# Patient Record
Sex: MALE | Race: ASIAN | NOT HISPANIC OR LATINO | Employment: FULL TIME | URBAN - METROPOLITAN AREA
[De-identification: names, ages, dates, MRNs, and addresses within clinical notes are randomized per-mention and may not be internally consistent; named-entity substitution may affect disease eponyms.]

---

## 2018-01-12 ENCOUNTER — OFFICE VISIT (OUTPATIENT)
Dept: PEDIATRICS | Facility: CLINIC | Age: 18
End: 2018-01-12
Payer: COMMERCIAL

## 2018-01-12 VITALS
SYSTOLIC BLOOD PRESSURE: 94 MMHG | HEART RATE: 95 BPM | DIASTOLIC BLOOD PRESSURE: 56 MMHG | HEIGHT: 66 IN | OXYGEN SATURATION: 98 % | BODY MASS INDEX: 22.63 KG/M2 | TEMPERATURE: 101.3 F | WEIGHT: 140.8 LBS

## 2018-01-12 DIAGNOSIS — R07.0 THROAT PAIN: ICD-10-CM

## 2018-01-12 DIAGNOSIS — J98.9 FEBRILE RESPIRATORY ILLNESS: ICD-10-CM

## 2018-01-12 DIAGNOSIS — R50.9 FEBRILE RESPIRATORY ILLNESS: ICD-10-CM

## 2018-01-12 DIAGNOSIS — J10.1 INFLUENZA A: Primary | ICD-10-CM

## 2018-01-12 LAB
DEPRECATED S PYO AG THROAT QL EIA: NORMAL
FLUAV+FLUBV AG SPEC QL: NEGATIVE
FLUAV+FLUBV AG SPEC QL: POSITIVE
SPECIMEN SOURCE: ABNORMAL
SPECIMEN SOURCE: NORMAL

## 2018-01-12 PROCEDURE — 87880 STREP A ASSAY W/OPTIC: CPT | Performed by: INTERNAL MEDICINE

## 2018-01-12 PROCEDURE — 87081 CULTURE SCREEN ONLY: CPT | Performed by: INTERNAL MEDICINE

## 2018-01-12 PROCEDURE — 87804 INFLUENZA ASSAY W/OPTIC: CPT | Performed by: INTERNAL MEDICINE

## 2018-01-12 PROCEDURE — 99213 OFFICE O/P EST LOW 20 MIN: CPT | Performed by: INTERNAL MEDICINE

## 2018-01-12 RX ORDER — OSELTAMIVIR PHOSPHATE 75 MG/1
75 CAPSULE ORAL 2 TIMES DAILY
Qty: 10 CAPSULE | Refills: 0 | Status: SHIPPED | OUTPATIENT
Start: 2018-01-12 | End: 2019-02-01

## 2018-01-12 NOTE — NURSING NOTE
"Chief Complaint   Patient presents with     Flu       Initial BP 94/56  Pulse 95  Temp 101.3  F (38.5  C) (Oral)  Ht 5' 5.75\" (1.67 m)  Wt 140 lb 12.8 oz (63.9 kg)  SpO2 98%  BMI 22.9 kg/m2 Estimated body mass index is 22.9 kg/(m^2) as calculated from the following:    Height as of this encounter: 5' 5.75\" (1.67 m).    Weight as of this encounter: 140 lb 12.8 oz (63.9 kg).  Medication Reconciliation: complete   Neida Perales MA      "

## 2018-01-12 NOTE — LETTER
January 12, 2018      Thomas Rodrigez  [address] [facility] [address]        To Whom It May Concern:    Thomas Rodrigez was seen on 1/12/18. Please excuse him from work on 1/13/18 & 1/14/18 due to illness.        Sincerely,        Sascha Loera MD

## 2018-01-12 NOTE — NURSING NOTE
The following medication was given:     MEDICATION: Tylenol  ROUTE: PO  SITE: mouth  DOSE: 650 mg   LOT #: [id number]   : [employer]  EXPIRATION DATE:  08/19  NDC#: 5152-8298-71  Neida Perales MA

## 2018-01-12 NOTE — PROGRESS NOTES
SUBJECTIVE:   Thomas Rodrigez is a 17 year old male who presents to clinic today for the following health issues:    RESPIRATORY SYMPTOMS    Duration: 1 day ago    Description  nasal congestion, rhinorrhea, sore throat, facial pain/pressure, cough,  fever, chills, headache, hoarse voice and myalgias    Severity: moderate    Accompanying signs and symptoms: None    History (predisposing factors):  none    Precipitating or alleviating factors: None    Therapies tried and outcome:  Vtiamins- Men and C, Dayquil    Woke up at 2am feeling terrible. Hit him all at once. Has not taken any tylenol or ibuprofen for the fever today.     No n/v  Drinking well  Decreased PO    School and working - people are probably sick  Brother was sick a few days ago -> fever, chills, respiratory.     Problem list and histories reviewed & adjusted, as indicated.  Additional history: as documented    There is no problem list on file for this patient.    History reviewed. No pertinent surgical history.    Social History   Substance Use Topics     Smoking status: Never Smoker     Smokeless tobacco: Never Used     Alcohol use No     Family History   Problem Relation Age of Onset     Family History Negative Mother      Family History Negative Father      Family History Negative Maternal Grandmother      Family History Negative Maternal Grandfather      Family History Negative Paternal Grandmother      Family History Negative Paternal Grandfather      Family History Negative Brother      Family History Negative Brother          Current Outpatient Prescriptions   Medication Sig Dispense Refill     oseltamivir (TAMIFLU) 75 MG capsule Take 1 capsule (75 mg) by mouth 2 times daily 10 capsule 0     No Known Allergies    Reviewed and updated as needed this visit by clinical staffTobacco  Allergies  Meds  Problems  Med Hx  Surg Hx  Fam Hx  Soc Hx        Reviewed and updated as needed this visit by Provider  Allergies  Meds  Problems      "    ROS:  Constitutional, HEENT, cardiovascular, pulmonary, gi and gu systems are negative, except as otherwise noted.      OBJECTIVE:   BP 94/56  Pulse 95  Temp 101.3  F (38.5  C) (Oral)  Ht 5' 5.75\" (1.67 m)  Wt 140 lb 12.8 oz (63.9 kg)  SpO2 98%  BMI 22.9 kg/m2  Body mass index is 22.9 kg/(m^2).  GENERAL: healthy, alert and no distress but obviously doesn't feel well  EYES: Eyes grossly normal to inspection, PERRL and conjunctivae and sclerae normal  HENT: ear canals and TM's normal, nose and mouth without ulcers or lesions, mild posterior pharynx erythema  NECK: no adenopathy, no asymmetry, masses, or scars and thyroid normal to palpation  RESP: lungs clear to auscultation - no rales, rhonchi or wheezes  CV: regular rhythm, tachycardic, normal S1 S2, no S3 or S4, no murmur, click or rub, no peripheral edema and peripheral pulses strong  ABDOMEN: soft, nontender, no hepatosplenomegaly, no masses and bowel sounds normal  MS: no gross musculoskeletal defects noted, no edema  SKIN: no suspicious lesions or rashes  NEURO: Normal strength and tone, mentation intact and speech normal  PSYCH: mentation appears normal, affect normal/bright    Diagnostic Test Results:  Results for orders placed or performed in visit on 01/12/18 (from the past 24 hour(s))   Influenza A/B antigen   Result Value Ref Range    Influenza A/B Agn Specimen Nasopharyngeal     Influenza A Positive (A) NEG^Negative    Influenza B Negative NEG^Negative   Strep, Rapid Screen   Result Value Ref Range    Specimen Description Throat     Rapid Strep A Screen       NEGATIVE: No Group A streptococcal antigen detected by immunoassay, await culture report.       ASSESSMENT/PLAN:       ICD-10-CM    1. Influenza A J10.1 oseltamivir (TAMIFLU) 75 MG capsule   2. Febrile respiratory illness J98.9 Influenza A/B antigen    R50.9    3. Throat pain R07.0 Strep, Rapid Screen     Beta strep group A culture     Discussed diagnosis, conservative care (rest, fluids, " tylenol/ibuprofen). Started Tamiflu as within window. No exposure to immunosuppressed or young children. See Patient Instructions.     Given tylenol 650 mg in clinic.    Patient Instructions   Nice to meet you today!    You have the flu.   Most important thing is to stay well hydrated.   Tylenol/ibuprofen okay for fevers.   Tamiflu helps decrease your symptoms by about a day.    If others around you get sick with similar symptoms tell them to call their doctor. They may be a candidate for Tamiflu if seen within 48 hours of symptoms starting.     Lots of hand washing!    Come back to see me for a regular Wellness visit when you're feeling better!    Sascha Loera MD  [facility]

## 2018-01-12 NOTE — MR AVS SNAPSHOT
After Visit Summary   1/12/2018    Thomas Rodrigez    MRN: [id number]           Patient Information     Date Of Birth          2000        Visit Information        Provider Department      1/12/2018 11:30 AM Sascha Loera MD [facility] [place]        Today's Diagnoses     Influenza A    -  1    Febrile respiratory illness        Throat pain          Care Instructions    Nice to meet you today!    You have the flu.   Most important thing is to stay well hydrated.   Tylenol/ibuprofen okay for fevers.   Tamiflu helps decrease your symptoms by about a day.    If others around you get sick with similar symptoms tell them to call their doctor. They may be a candidate for Tamiflu if seen within 48 hours of symptoms starting.     Lots of hand washing!    Come back to see me for a regular Wellness visit when you're feeling better!    Oseltamivir capsules  Brand Name: Tamiflu  What is this medicine?  OSELTAMIVIR (os el AMBROCIO i vir) is an antiviral medicine. It is used to prevent and to treat some kinds of influenza or the flu. It will not work for colds or other viral infections.  How should I use this medicine?  Take this medicine by mouth with a glass of water. Follow the directions on the prescription label. Start this medicine at the first sign of flu symptoms. You can take it with or without food. If it upsets your stomach, take it with food. Take your medicine at regular intervals. Do not take your medicine more often than directed. Take all of your medicine as directed even if you think you are better. Do not skip doses or stop your medicine early.  Talk to your pediatrician regarding the use of this medicine in children. While this drug may be prescribed for children as young as 14 days for selected conditions, precautions do apply.  What side effects may I notice from receiving this medicine?  Side effects that you should report to your doctor or health care professional as soon as  possible:    allergic reactions like skin rash, itching or hives, swelling of the face, lips, or tongue    anxiety, confusion, unusual behavior    breathing problems    hallucination, loss of contact with reality    redness, blistering, peeling or loosening of the skin, including inside the mouth    seizures  Side effects that usually do not require medical attention (report to your doctor or health care professional if they continue or are bothersome):    diarrhea    headache    nausea, vomiting    pain  What may interact with this medicine?  Interactions are not expected.  What if I miss a dose?  If you miss a dose, take it as soon as you remember. If it is almost time for your next dose (within 2 hours), take only that dose. Do not take double or extra doses.  Where should I keep my medicine?  Keep out of the reach of children.  Store at room temperature between 15 and 30 degrees C (59 and 86 degrees F). Throw away any unused medicine after the expiration date.  What should I tell my health care provider before I take this medicine?  They need to know if you have any of the following conditions:    heart disease    immune system problems    kidney disease    liver disease    lung disease    an unusual or allergic reaction to oseltamivir, other medicines, foods, dyes, or preservatives    pregnant or trying to get pregnant    breast-feeding  What should I watch for while using this medicine?  Visit your doctor or health care professional for regular check ups. Tell your doctor if your symptoms do not start to get better or if they get worse.  If you have the flu, you may be at an increased risk of developing seizures, confusion, or abnormal behavior. This occurs early in the illness, and more frequently in children and teens. These events are not common, but may result in accidental injury to the patient. Families and caregivers of patients should watch for signs of unusual behavior and contact a doctor or health  care professional right away if the patient shows signs of unusual behavior.  This medicine is not a substitute for the flu shot. Talk to your doctor each year about an annual flu shot.  NOTE:This sheet is a summary. It may not cover all possible information. If you have questions about this medicine, talk to your doctor, pharmacist, or health care provider. Copyright  2017 [employer]        Oseltamivir capsules  Brand Name: Tamiflu  What is this medicine?  OSELTAMIVIR (os el AMBROCIO i vir) is an antiviral medicine. It is used to prevent and to treat some kinds of influenza or the flu. It will not work for colds or other viral infections.  How should I use this medicine?  Take this medicine by mouth with a glass of water. Follow the directions on the prescription label. Start this medicine at the first sign of flu symptoms. You can take it with or without food. If it upsets your stomach, take it with food. Take your medicine at regular intervals. Do not take your medicine more often than directed. Take all of your medicine as directed even if you think you are better. Do not skip doses or stop your medicine early.  Talk to your pediatrician regarding the use of this medicine in children. While this drug may be prescribed for children as young as 14 days for selected conditions, precautions do apply.  What side effects may I notice from receiving this medicine?  Side effects that you should report to your doctor or health care professional as soon as possible:    allergic reactions like skin rash, itching or hives, swelling of the face, lips, or tongue    anxiety, confusion, unusual behavior    breathing problems    hallucination, loss of contact with reality    redness, blistering, peeling or loosening of the skin, including inside the mouth    seizures  Side effects that usually do not require medical attention (report to your doctor or health care professional if they continue or are  bothersome):    diarrhea    headache    nausea, vomiting    pain  What may interact with this medicine?  Interactions are not expected.  What if I miss a dose?  If you miss a dose, take it as soon as you remember. If it is almost time for your next dose (within 2 hours), take only that dose. Do not take double or extra doses.  Where should I keep my medicine?  Keep out of the reach of children.  Store at room temperature between 15 and 30 degrees C (59 and 86 degrees F). Throw away any unused medicine after the expiration date.  What should I tell my health care provider before I take this medicine?  They need to know if you have any of the following conditions:    heart disease    immune system problems    kidney disease    liver disease    lung disease    an unusual or allergic reaction to oseltamivir, other medicines, foods, dyes, or preservatives    pregnant or trying to get pregnant    breast-feeding  What should I watch for while using this medicine?  Visit your doctor or health care professional for regular check ups. Tell your doctor if your symptoms do not start to get better or if they get worse.  If you have the flu, you may be at an increased risk of developing seizures, confusion, or abnormal behavior. This occurs early in the illness, and more frequently in children and teens. These events are not common, but may result in accidental injury to the patient. Families and caregivers of patients should watch for signs of unusual behavior and contact a doctor or health care professional right away if the patient shows signs of unusual behavior.  This medicine is not a substitute for the flu shot. Talk to your doctor each year about an annual flu shot.  NOTE:This sheet is a summary. It may not cover all possible information. If you have questions about this medicine, talk to your doctor, pharmacist, or health care provider. Copyright  2017 [employer]                Follow-ups after your visit        Follow-up  "notes from your care team     Return in about 4 weeks (around 2018) for Wellness Visit.      Who to contact     If you have questions or need follow up information about today's clinic visit or your schedule please contact [facility] directly at [phone].  Normal or non-critical lab and imaging results will be communicated to you by MyChart, letter or phone within 4 business days after the clinic has received the results. If you do not hear from us within 7 days, please contact the clinic through MyChart or phone. If you have a critical or abnormal lab result, we will notify you by phone as soon as possible.  Submit refill requests through [employer] or call your pharmacy and they will forward the refill request to us. Please allow 3 business days for your refill to be completed.          Additional Information About Your Visit        MyChart Information     [employer] lets you send messages to your doctor, view your test results, renew your prescriptions, schedule appointments and more. To sign up, go to [email or web address] . Click on \"Log in\" on the left side of the screen, which will take you to the Welcome page. Then click on \"Sign up Now\" on the right side of the page.     You will be asked to enter the access code listed below, as well as some personal information. Please follow the directions to create your username and password.     Your access code is: [email or web address]  Expires: 2018 12:20 PM     Your access code will  in 90 days. If you need help or a new code, please call your [place] clinic or [phone].        Care EveryWhere ID     This is your Care EveryWhere ID. This could be used by other organizations to access your [place] medical records  [id number]        Your Vitals Were     Pulse Temperature Height Pulse Oximetry BMI (Body Mass Index)       95 101.3  F (38.5  C) (Oral) 5' 5.75\" (1.67 m) 98% 22.9 kg/m2        Blood Pressure from Last 3 Encounters:   18 " 94/56    Weight from Last 3 Encounters:   01/12/18 140 lb 12.8 oz (63.9 kg) (26 %)*   05/13/11 106 lb 3.2 oz (48.2 kg) (57 %)*     * Growth percentiles are based on [facility] 2-20 Years data.              We Performed the Following     Beta strep group A culture     Influenza A/B antigen     Strep, Rapid Screen          Today's Medication Changes          These changes are accurate as of: 1/12/18 12:20 PM.  If you have any questions, ask your nurse or doctor.               Start taking these medicines.        Dose/Directions    oseltamivir 75 MG capsule   Commonly known as:  TAMIFLU   Used for:  Influenza A   Started by:  Sascha Loera MD        Dose:  75 mg   Take 1 capsule (75 mg) by mouth 2 times daily   Quantity:  10 capsule   Refills:  0            Where to get your medicines      These medications were sent to [facility] - [address]   [address]  [address], [place] [address]     Phone:  [phone]     oseltamivir 75 MG capsule                Primary Care Provider Office Phone # Fax #    Sascha Loera [phone]       [facility] [facility] [address]        Equal Access to Services     TORI ROBINS AH: Hadii tonia ku hadasho Soomaali, waaxda luqadaha, qaybta kaalmada adeegyada, waxay lisain hayhaydern esau donahue. So [facility] [phone].    ATENCIÓN: Si habla español, tiene a neely disposición servicios gratuitos de asistencia lingüística. Llame al [phone].    We comply with applicable federal civil rights laws and [place] laws. We do not discriminate on the basis of race, color, national origin, age, disability, sex, sexual orientation, or gender identity.            Thank you!     Thank you for choosing [facility]  for your care. Our goal is always to provide you with excellent care. Hearing back from our patients is one way we can continue to improve our services. Please take a few minutes  to complete the written survey that you may receive in the mail after your visit with us. Thank you!             Your Updated Medication List - Protect others around you: Learn how to safely use, store and throw away your medicines at www.disposemymeds.org.          This list is accurate as of: 1/12/18 12:20 PM.  Always use your most recent med list.                   Brand Name Dispense Instructions for use Diagnosis    oseltamivir 75 MG capsule    TAMIFLU    10 capsule    Take 1 capsule (75 mg) by mouth 2 times daily    Influenza A

## 2018-01-12 NOTE — PATIENT INSTRUCTIONS
Nice to meet you today!    You have the flu.   Most important thing is to stay well hydrated.   Tylenol/ibuprofen okay for fevers.   Tamiflu helps decrease your symptoms by about a day.    If others around you get sick with similar symptoms tell them to call their doctor. They may be a candidate for Tamiflu if seen within 48 hours of symptoms starting.     Lots of hand washing!    Come back to see me for a regular Wellness visit when you're feeling better!    Oseltamivir capsules  Brand Name: Tamiflu  What is this medicine?  OSELTAMIVIR (os el AMBROCIO i vir) is an antiviral medicine. It is used to prevent and to treat some kinds of influenza or the flu. It will not work for colds or other viral infections.  How should I use this medicine?  Take this medicine by mouth with a glass of water. Follow the directions on the prescription label. Start this medicine at the first sign of flu symptoms. You can take it with or without food. If it upsets your stomach, take it with food. Take your medicine at regular intervals. Do not take your medicine more often than directed. Take all of your medicine as directed even if you think you are better. Do not skip doses or stop your medicine early.  Talk to your pediatrician regarding the use of this medicine in children. While this drug may be prescribed for children as young as 14 days for selected conditions, precautions do apply.  What side effects may I notice from receiving this medicine?  Side effects that you should report to your doctor or health care professional as soon as possible:    allergic reactions like skin rash, itching or hives, swelling of the face, lips, or tongue    anxiety, confusion, unusual behavior    breathing problems    hallucination, loss of contact with reality    redness, blistering, peeling or loosening of the skin, including inside the mouth    seizures  Side effects that usually do not require medical attention (report to your doctor or health care  professional if they continue or are bothersome):    diarrhea    headache    nausea, vomiting    pain  What may interact with this medicine?  Interactions are not expected.  What if I miss a dose?  If you miss a dose, take it as soon as you remember. If it is almost time for your next dose (within 2 hours), take only that dose. Do not take double or extra doses.  Where should I keep my medicine?  Keep out of the reach of children.  Store at room temperature between 15 and 30 degrees C (59 and 86 degrees F). Throw away any unused medicine after the expiration date.  What should I tell my health care provider before I take this medicine?  They need to know if you have any of the following conditions:    heart disease    immune system problems    kidney disease    liver disease    lung disease    an unusual or allergic reaction to oseltamivir, other medicines, foods, dyes, or preservatives    pregnant or trying to get pregnant    breast-feeding  What should I watch for while using this medicine?  Visit your doctor or health care professional for regular check ups. Tell your doctor if your symptoms do not start to get better or if they get worse.  If you have the flu, you may be at an increased risk of developing seizures, confusion, or abnormal behavior. This occurs early in the illness, and more frequently in children and teens. These events are not common, but may result in accidental injury to the patient. Families and caregivers of patients should watch for signs of unusual behavior and contact a doctor or health care professional right away if the patient shows signs of unusual behavior.  This medicine is not a substitute for the flu shot. Talk to your doctor each year about an annual flu shot.  NOTE:This sheet is a summary. It may not cover all possible information. If you have questions about this medicine, talk to your doctor, pharmacist, or health care provider. Copyright  2017 [employer]        Oseltamivir  capsules  Brand Name: Tamiflu  What is this medicine?  OSELTAMIVIR (os el AMBROCIO i vir) is an antiviral medicine. It is used to prevent and to treat some kinds of influenza or the flu. It will not work for colds or other viral infections.  How should I use this medicine?  Take this medicine by mouth with a glass of water. Follow the directions on the prescription label. Start this medicine at the first sign of flu symptoms. You can take it with or without food. If it upsets your stomach, take it with food. Take your medicine at regular intervals. Do not take your medicine more often than directed. Take all of your medicine as directed even if you think you are better. Do not skip doses or stop your medicine early.  Talk to your pediatrician regarding the use of this medicine in children. While this drug may be prescribed for children as young as 14 days for selected conditions, precautions do apply.  What side effects may I notice from receiving this medicine?  Side effects that you should report to your doctor or health care professional as soon as possible:    allergic reactions like skin rash, itching or hives, swelling of the face, lips, or tongue    anxiety, confusion, unusual behavior    breathing problems    hallucination, loss of contact with reality    redness, blistering, peeling or loosening of the skin, including inside the mouth    seizures  Side effects that usually do not require medical attention (report to your doctor or health care professional if they continue or are bothersome):    diarrhea    headache    nausea, vomiting    pain  What may interact with this medicine?  Interactions are not expected.  What if I miss a dose?  If you miss a dose, take it as soon as you remember. If it is almost time for your next dose (within 2 hours), take only that dose. Do not take double or extra doses.  Where should I keep my medicine?  Keep out of the reach of children.  Store at room temperature between 15 and  30 degrees C (59 and 86 degrees F). Throw away any unused medicine after the expiration date.  What should I tell my health care provider before I take this medicine?  They need to know if you have any of the following conditions:    heart disease    immune system problems    kidney disease    liver disease    lung disease    an unusual or allergic reaction to oseltamivir, other medicines, foods, dyes, or preservatives    pregnant or trying to get pregnant    breast-feeding  What should I watch for while using this medicine?  Visit your doctor or health care professional for regular check ups. Tell your doctor if your symptoms do not start to get better or if they get worse.  If you have the flu, you may be at an increased risk of developing seizures, confusion, or abnormal behavior. This occurs early in the illness, and more frequently in children and teens. These events are not common, but may result in accidental injury to the patient. Families and caregivers of patients should watch for signs of unusual behavior and contact a doctor or health care professional right away if the patient shows signs of unusual behavior.  This medicine is not a substitute for the flu shot. Talk to your doctor each year about an annual flu shot.  NOTE:This sheet is a summary. It may not cover all possible information. If you have questions about this medicine, talk to your doctor, pharmacist, or health care provider. Copyright  2017 [employer]vier

## 2018-01-13 LAB
BACTERIA SPEC CULT: NORMAL
SPECIMEN SOURCE: NORMAL

## 2018-01-20 ENCOUNTER — HEALTH MAINTENANCE LETTER (OUTPATIENT)
Age: 18
End: 2018-01-20

## 2019-02-01 ENCOUNTER — OFFICE VISIT (OUTPATIENT)
Dept: PEDIATRICS | Facility: CLINIC | Age: 19
End: 2019-02-01
Payer: COMMERCIAL

## 2019-02-01 VITALS
HEIGHT: 65 IN | TEMPERATURE: 97.3 F | DIASTOLIC BLOOD PRESSURE: 54 MMHG | OXYGEN SATURATION: 98 % | BODY MASS INDEX: 20.66 KG/M2 | HEART RATE: 52 BPM | WEIGHT: 124 LBS | SYSTOLIC BLOOD PRESSURE: 98 MMHG

## 2019-02-01 DIAGNOSIS — R04.0 EPISTAXIS: Primary | ICD-10-CM

## 2019-02-01 PROCEDURE — 99213 OFFICE O/P EST LOW 20 MIN: CPT | Performed by: PHYSICIAN ASSISTANT

## 2019-02-01 ASSESSMENT — MIFFLIN-ST. JEOR: SCORE: 1499.34

## 2019-02-01 NOTE — PROGRESS NOTES
"  SUBJECTIVE:   Thomas Rodrigez is a 18 year old male who presents to clinic today for the following health issues:  Concern - Nosebleeds  Onset: 2-3 months    Description:   bloody nose    Intensity: moderate, severe    Progression of Symptoms:  worsening    Accompanying Signs & Symptoms:    Left nostril  Slight nasal congestion, last from 1-15 minutes, once per day every 2-3 days    Previous history of similar problem:   Yes 7 yrs ago    Precipitating factors:   Worsened by: long showers, bending over    Alleviating factors:  Improved by: cold pressure  Therapies Tried and outcome: none    Work: [employer]     ROS:  ROS otherwise negative    OBJECTIVE:                                                    BP 98/54 (BP Location: Right arm, Patient Position: Sitting, Cuff Size: Adult Regular)   Pulse 52   Temp 97.3  F (36.3  C) (Tympanic)   Ht 1.651 m (5' 5\")   Wt 56.2 kg (124 lb)   SpO2 98%   BMI 20.63 kg/m    Body mass index is 20.63 kg/m .   GENERAL: alert, no distress  HENT: ear canals- normal; TMs- normal; Nose- blood noted in the left nostril; Mouth- no ulcers, no lesions  NECK: no tenderness, no adenopathy  RESP: lungs clear to auscultation - no rales, no rhonchi, no wheezes  CV: regular rates and rhythm, normal S1 S2, no S3 or S4 and no murmur, no click or rub    Diagnostic test results:  No results found for this or any previous visit (from the past 24 hour(s)).     ASSESSMENT/PLAN:                                                    (R04.0) Epistaxis  (primary encounter diagnosis)  Comment: given frequency, proceed with ENT evaluation.  Plan: OTOLARYNGOLOGY REFERRAL          Ernst Daugherty PA-C  [facility]  "

## 2019-02-06 ENCOUNTER — OFFICE VISIT (OUTPATIENT)
Dept: PEDIATRICS | Facility: CLINIC | Age: 19
End: 2019-02-06
Payer: COMMERCIAL

## 2019-02-06 VITALS
DIASTOLIC BLOOD PRESSURE: 58 MMHG | WEIGHT: 125 LBS | HEIGHT: 65 IN | BODY MASS INDEX: 20.83 KG/M2 | OXYGEN SATURATION: 99 % | TEMPERATURE: 97.8 F | SYSTOLIC BLOOD PRESSURE: 106 MMHG | HEART RATE: 55 BPM

## 2019-02-06 DIAGNOSIS — R04.0 EPISTAXIS: Primary | ICD-10-CM

## 2019-02-06 LAB
BASOPHILS # BLD AUTO: 0 10E9/L (ref 0–0.2)
BASOPHILS NFR BLD AUTO: 0.3 %
DIFFERENTIAL METHOD BLD: ABNORMAL
EOSINOPHIL # BLD AUTO: 0.1 10E9/L (ref 0–0.7)
EOSINOPHIL NFR BLD AUTO: 1 %
ERYTHROCYTE [DISTWIDTH] IN BLOOD BY AUTOMATED COUNT: 15.1 % (ref 10–15)
HCT VFR BLD AUTO: 42.3 % (ref 40–53)
HGB BLD-MCNC: 13.7 G/DL (ref 13.3–17.7)
LYMPHOCYTES # BLD AUTO: 3.1 10E9/L (ref 0.8–5.3)
LYMPHOCYTES NFR BLD AUTO: 45.3 %
MCH RBC QN AUTO: 21.7 PG (ref 26.5–33)
MCHC RBC AUTO-ENTMCNC: 32.4 G/DL (ref 31.5–36.5)
MCV RBC AUTO: 67 FL (ref 78–100)
MONOCYTES # BLD AUTO: 0.7 10E9/L (ref 0–1.3)
MONOCYTES NFR BLD AUTO: 10.4 %
NEUTROPHILS # BLD AUTO: 2.9 10E9/L (ref 1.6–8.3)
NEUTROPHILS NFR BLD AUTO: 43 %
PLATELET # BLD AUTO: 255 10E9/L (ref 150–450)
RBC # BLD AUTO: 6.32 10E12/L (ref 4.4–5.9)
WBC # BLD AUTO: 6.7 10E9/L (ref 4–11)

## 2019-02-06 PROCEDURE — 99213 OFFICE O/P EST LOW 20 MIN: CPT | Performed by: INTERNAL MEDICINE

## 2019-02-06 PROCEDURE — 85025 COMPLETE CBC W/AUTO DIFF WBC: CPT | Performed by: INTERNAL MEDICINE

## 2019-02-06 PROCEDURE — 36415 COLL VENOUS BLD VENIPUNCTURE: CPT | Performed by: INTERNAL MEDICINE

## 2019-02-06 ASSESSMENT — MIFFLIN-ST. JEOR: SCORE: 1503.88

## 2019-02-06 NOTE — PROGRESS NOTES
"  SUBJECTIVE:   Thomas Rodrigez is a 18 year old male who presents to clinic today for the following health issues:    Pt is here to follow up on nosebleeds     Ongoing issues w/ nosebleeds. Has experienced for the past several years.  Over the past few weeks, has increased in frequency. At least once per day, sometimes multiple.  Evaluated in clinic last week, was given ENT referral but has not yet set up a visit.  Developed a nosebleed during the OV today.  Uses pressure. Typically stops bleeding w/in 5-10 minutes.  Vaseline use at nighttime.  Not using saline spray.  Denies trauma to the nasal passage. Left sided only.   Humidifier at nighttime as well.    Problem list and histories reviewed & adjusted, as indicated.      Labs reviewed in EPIC      OBJECTIVE:     /58 (BP Location: Right arm, Patient Position: Sitting, Cuff Size: Adult Regular)   Pulse 55   Temp 97.8  F (36.6  C) (Tympanic)   Ht 1.651 m (5' 5\")   Wt 56.7 kg (125 lb)   SpO2 99%   BMI 20.80 kg/m    Body mass index is 20.8 kg/m .  GEN: no distress  ENT: Active nasal bleeding left naris.       ASSESSMENT/PLAN:       ICD-10-CM    1. Epistaxis R04.0 CBC with platelets differential     Recurrent epistaxis. Needs to see ENT. Referral information given again.  CBC checked. Not anemic. Normal platelet count.  Has RBC size changes c/w thalassemia trait.    Benjamín Friedman MD  [facility]  "

## 2019-02-06 NOTE — PATIENT INSTRUCTIONS
[facility] [phone]    [facility] [place]       Epistaxis (Nosebleed)  Nosebleeds can be caused by many conditions including trauma, infections, polyps, foreign bodies, dry mucous membranes or climate, medications and air conditioning. Most nosebleeds occur in the front of the nose. It is because of this location that most nosebleeds can be controlled by pinching the nostrils gently and continuously. Do this for at least 10 to 20 minutes. The reason for this long continuous pressure is that you must hold it long enough for the blood to clot. If during that 10 to 20 minute time period, pressure is released, the process may have to be started again. The nosebleed may stop by itself, quit with pressure, need concentrated heating (cautery) or stop with pressure from packing.  HOME CARE INSTRUCTIONS  If your nose was packed, try to maintain the pack inside until your caregiver removes it. If a gauze pack was used and it starts to fall out, gently replace or cut the end off. Do NOT cut if a balloon catheter was used to pack the nose. Otherwise, do not remove unless instructed.   Avoid blowing your nose for 12 hours after treatment. This could dislodge the pack or clot and start bleeding again.   If the bleeding starts again, sit up and bending forward, gently pinch the front half of your nose continuously for 20 minutes.   If bleeding was caused by dry mucous membranes, cover the inside of your nose every morning with a petroleum or antibiotic ointment. Use your little fingertip as an applicator. Do this as needed during dry weather. This will keep the mucous membranes moist and allow them to heal.   Maintain humidity in your home by using less air conditioning or using a humidifier.   Do not use aspirin or medications which make bleeding more likely. Your caregiver can give you recommendations on this.   Resume normal activities as able but  try to avoid straining, lifting or bending at the waist for several days.   If the nosebleeds become recurrent and the cause is unknown, your caregiver may suggest laboratory tests.  SEEK IMMEDIATE MEDICAL CARE IF:  Bleeding recurs and cannot be controlled.   There is unusual bleeding from or bruising on other parts of the body.   You have an oral temperature above 102  F (38.9  C), not controlled with medication.   Nosebleeds continue.   There is any worsening of the condition which originally brought you in.   You become light headed, feel faint, become sweaty or vomit blood.  MAKE SURE YOU:  Understand these instructions.   Will watch your condition.   Will get help right away if you are not doing well or get worse.     Document Released: 09/27/2006 Document Re-Released: 12/06/2010  [facility] [place]  Patient Information  2012 creads.

## 2019-02-06 NOTE — LETTER
[facility]  [address]  [place] [address]                  [phone]   February 8, 2019    Thomas JADA Rain  [address] [id number]      Thomas:     Your blood counts are essentially normal. You are not anemic, and your platelet count is normal.     The size of your red blood cells is slightly outside of the normal range (MCV and MCH). Your RBC count is elevated. This is most often related to a genetic change in your hemoglobin and likely represents a Thalassemia trait.     Thalassemia trait does not cause symptoms and does not require any additional evaluation. This should not be related to your nosebleeds.     Please feel free to contact me if you have any questions or concerns.       Benjamín Friedman MD    Results for orders placed or performed in visit on 02/06/19   CBC with platelets differential   Result Value Ref Range    WBC 6.7 4.0 - 11.0 10e9/L    RBC Count 6.32 (H) 4.4 - 5.9 10e12/L    Hemoglobin 13.7 13.3 - 17.7 g/dL    Hematocrit 42.3 40.0 - 53.0 %    MCV 67 (L) 78 - 100 fl    MCH 21.7 (L) 26.5 - 33.0 pg    MCHC 32.4 31.5 - 36.5 g/dL    RDW 15.1 (H) 10.0 - 15.0 %    Platelet Count 255 150 - 450 10e9/L    Diff Method Automated Method     % Neutrophils 43.0 %    % Lymphocytes 45.3 %    % Monocytes 10.4 %    % Eosinophils 1.0 %    % Basophils 0.3 %    Absolute Neutrophil 2.9 1.6 - 8.3 10e9/L    Absolute Lymphocytes 3.1 0.8 - 5.3 10e9/L    Absolute Monocytes 0.7 0.0 - 1.3 10e9/L    Absolute Eosinophils 0.1 0.0 - 0.7 10e9/L    Absolute Basophils 0.0 0.0 - 0.2 10e9/L

## 2019-02-08 ENCOUNTER — TRANSFERRED RECORDS (OUTPATIENT)
Dept: HEALTH INFORMATION MANAGEMENT | Facility: CLINIC | Age: 19
End: 2019-02-08

## 2023-10-02 ENCOUNTER — HOSPITAL ENCOUNTER (EMERGENCY)
Facility: CLINIC | Age: 23
Discharge: HOME | End: 2023-10-02
Admitting: EMERGENCY MEDICINE
Payer: COMMERCIAL

## 2023-10-02 ENCOUNTER — APPOINTMENT (OUTPATIENT)
Dept: GENERAL RADIOLOGY | Facility: CLINIC | Age: 23
End: 2023-10-02
Attending: EMERGENCY MEDICINE
Payer: COMMERCIAL

## 2023-10-02 VITALS
DIASTOLIC BLOOD PRESSURE: 75 MMHG | RESPIRATION RATE: 20 BRPM | HEIGHT: 66 IN | BODY MASS INDEX: 19.61 KG/M2 | OXYGEN SATURATION: 98 % | SYSTOLIC BLOOD PRESSURE: 115 MMHG | TEMPERATURE: 98.7 F | WEIGHT: 122 LBS | HEART RATE: 68 BPM

## 2023-10-02 DIAGNOSIS — R00.2 PALPITATIONS: ICD-10-CM

## 2023-10-02 LAB
ANION GAP SERPL CALCULATED.3IONS-SCNC: 11 MMOL/L (ref 7–15)
BASO+EOS+MONOS # BLD AUTO: ABNORMAL 10*3/UL
BASO+EOS+MONOS NFR BLD AUTO: ABNORMAL %
BASOPHILS # BLD AUTO: 0 10E3/UL (ref 0–0.2)
BASOPHILS NFR BLD AUTO: 0 %
BUN SERPL-MCNC: 19.2 MG/DL (ref 6–20)
CALCIUM SERPL-MCNC: 9.3 MG/DL (ref 8.6–10)
CHLORIDE SERPL-SCNC: 105 MMOL/L (ref 98–107)
CREAT SERPL-MCNC: 1.11 MG/DL (ref 0.67–1.17)
DEPRECATED HCO3 PLAS-SCNC: 24 MMOL/L (ref 22–29)
EGFRCR SERPLBLD CKD-EPI 2021: >90 ML/MIN/1.73M2
EOSINOPHIL # BLD AUTO: 0.1 10E3/UL (ref 0–0.7)
EOSINOPHIL NFR BLD AUTO: 1 %
ERYTHROCYTE [DISTWIDTH] IN BLOOD BY AUTOMATED COUNT: 14.1 % (ref 10–15)
GLUCOSE SERPL-MCNC: 124 MG/DL (ref 70–99)
HCT VFR BLD AUTO: 45.6 % (ref 40–53)
HGB BLD-MCNC: 14.1 G/DL (ref 13.3–17.7)
HOLD SPECIMEN: NORMAL
IMM GRANULOCYTES # BLD: 0 10E3/UL
IMM GRANULOCYTES NFR BLD: 0 %
LYMPHOCYTES # BLD AUTO: 3 10E3/UL (ref 0.8–5.3)
LYMPHOCYTES NFR BLD AUTO: 61 %
MCH RBC QN AUTO: 22.2 PG (ref 26.5–33)
MCHC RBC AUTO-ENTMCNC: 30.9 G/DL (ref 31.5–36.5)
MCV RBC AUTO: 72 FL (ref 78–100)
MONOCYTES # BLD AUTO: 0.4 10E3/UL (ref 0–1.3)
MONOCYTES NFR BLD AUTO: 7 %
NEUTROPHILS # BLD AUTO: 1.5 10E3/UL (ref 1.6–8.3)
NEUTROPHILS NFR BLD AUTO: 31 %
NRBC # BLD AUTO: 0 10E3/UL
NRBC BLD AUTO-RTO: 0 /100
PLATELET # BLD AUTO: 220 10E3/UL (ref 150–450)
POTASSIUM SERPL-SCNC: 4.3 MMOL/L (ref 3.4–5.3)
RBC # BLD AUTO: 6.36 10E6/UL (ref 4.4–5.9)
SODIUM SERPL-SCNC: 140 MMOL/L (ref 135–145)
TROPONIN T SERPL HS-MCNC: <6 NG/L
WBC # BLD AUTO: 5 10E3/UL (ref 4–11)

## 2023-10-02 PROCEDURE — 71046 X-RAY EXAM CHEST 2 VIEWS: CPT

## 2023-10-02 PROCEDURE — 84484 ASSAY OF TROPONIN QUANT: CPT | Performed by: EMERGENCY MEDICINE

## 2023-10-02 PROCEDURE — 80048 BASIC METABOLIC PNL TOTAL CA: CPT | Performed by: EMERGENCY MEDICINE

## 2023-10-02 PROCEDURE — 93005 ELECTROCARDIOGRAM TRACING: CPT

## 2023-10-02 PROCEDURE — 85025 COMPLETE CBC W/AUTO DIFF WBC: CPT | Performed by: EMERGENCY MEDICINE

## 2023-10-02 PROCEDURE — 36415 COLL VENOUS BLD VENIPUNCTURE: CPT | Performed by: EMERGENCY MEDICINE

## 2023-10-02 PROCEDURE — 99285 EMERGENCY DEPT VISIT HI MDM: CPT | Mod: 25

## 2023-10-02 NOTE — DISCHARGE INSTRUCTIONS
Please return to the emergency department if your symptoms increase, you experience an increase in shortness of breath or chest pain.    Please drink plenty of fluids.  Please follow-up with your primary care provider in the next week.

## 2023-10-02 NOTE — ED TRIAGE NOTES
"Pt c/o racing heart last night while taking a shower. Pt felt like his vision was \"blurry\", sat on the toilet and did some deep breathing then felt better. Pt notes he had a similar episode 6+ months ago, also while in the shower, also  notes he was outside at the [employer] festival yesterday in 90 degree heat. Now pt c/o some \"pressure\" in his chest.      Triage Assessment       Row Name 10/02/23 1227       Triage Assessment (Adult)    Airway WDL WDL       Respiratory WDL    Respiratory WDL WDL       Skin Circulation/Temperature WDL    Skin Circulation/Temperature WDL WDL       Cardiac WDL    Cardiac WDL WDL       Peripheral/Neurovascular WDL    Peripheral Neurovascular WDL WDL       Cognitive/Neuro/Behavioral WDL    Cognitive/Neuro/Behavioral WDL WDL                    "

## 2023-10-02 NOTE — ED PROVIDER NOTES
"    History     Chief Complaint:  Chest tightness      The history is provided by the patient.      Thomas Rodrigez is a 23 year old male who presents with an episode of tachycardia that occurred yesterday and chest tightness that occurred today. The patient reports that he was in the shower during tachycardia episode. He left and shower and did some deep breathing. He then measured his heart rate 20 minutes later in which his BPM measured 45. Today, he notes chest tightness upon waking at approximately 0930 that has since resolved since presentation. The patient states that he also went to [employer] yesterday and was walking outside. Currently, the patient denies any symptoms at this time.     Independent Historian:    No independent historian     Review of External Notes:       Medications:    Albuterol     Past Medical History:    Allergic rhinitis     Physical Exam   Patient Vitals for the past 24 hrs:   BP Temp Temp src Pulse Resp SpO2 Height Weight   10/02/23 1225 139/86 98.7  F (37.1  C) Temporal 65 20 100 % 1.676 m (5' 6\") 55.3 kg (122 lb)        Physical Exam  General: Well-nourished, resting comfortably when I enter the room  Eyes: Pupils equal, conjunctivae pink no scleral icterus or conjunctival injection  ENT:  Moist mucus membranes  Respiratory:  Lungs clear to auscultation bilaterally, no crackles/rubs/wheezes.  Good air movement  CV: Normal rate and rhythm, no murmurs  GI:  Abdomen soft and non-distended.  No tenderness, guarding or rebound  Skin: Warm, dry.  No rashes or petechiae  Musculoskeletal: No peripheral edema or calf tenderness  Neuro: Alert and oriented to person/place/time  Psychiatric: Normal affect     Emergency Department Course   ECG  ECG taken at 1220, ECG read at 1221  Sinus bradycardia   Incomplete right bundle branch block   Borderline ECG  Rate 52 bpm. AZ interval 116 ms. QRS duration 112 ms. QT/QTc 422/392 ms. P-R-T axes 70 66 71.     Imaging:  XR Chest 2 Views "   Preliminary Result   IMPRESSION: No acute cardiopulmonary disease.        Report per radiology    Laboratory:  Labs Ordered and Resulted from Time of ED Arrival to Time of ED Departure   BASIC METABOLIC PANEL - Abnormal       Result Value    Sodium 140      Potassium 4.3      Chloride 105      Carbon Dioxide (CO2) 24      Anion Gap 11      Urea Nitrogen 19.2      Creatinine 1.11      GFR Estimate >90      Calcium 9.3      Glucose 124 (*)    CBC WITH PLATELETS AND DIFFERENTIAL - Abnormal    WBC Count 5.0      RBC Count 6.36 (*)     Hemoglobin 14.1      Hematocrit 45.6      MCV 72 (*)     MCH 22.2 (*)     MCHC 30.9 (*)     RDW 14.1      Platelet Count 220      % Neutrophils 31      % Lymphocytes 61      % Monocytes 7      Mids % (Monos, Eos, Basos)        % Eosinophils 1      % Basophils 0      % Immature Granulocytes 0      NRBCs per 100 WBC 0      Absolute Neutrophils 1.5 (*)     Absolute Lymphocytes 3.0      Absolute Monocytes 0.4      Mids Abs (Monos, Eos, Basos)        Absolute Eosinophils 0.1      Absolute Basophils 0.0      Absolute Immature Granulocytes 0.0      Absolute NRBCs 0.0     TROPONIN T, HIGH SENSITIVITY - Normal    Troponin T, High Sensitivity <6        Emergency Department Course & Assessments:         Assessments:  1405 I obtained history and examined the patient as noted above. I explained findings to the patient and we discussed plan for discharge. The patient is comfortable with this plan.    Independent Interpretation (X-rays, CTs, rhythm strip):  Chest x-ray does not show any consolidations, pleural effusion, pneumothorax    Consultations/Discussion of Management or Tests:  None       Social Determinants of Health affecting care:  None     Disposition:  The patient was discharged to home.     Impression & Plan    CMS Diagnoses: None    Medical Decision Makin-year-old otherwise healthy male presents emergency department with a complaint of an episode of a racing heart yesterday, and  chest pain today.  Patient reports that yesterday he was at the [facility] festival walking around in the heat.  When he returned home he took a very hot shower with steam.  He states that he started to feel his heart race.  He got out of the shower sat down on the toilet and did some deep breathing.  His symptoms resolved.  Patient reports today when he woke up he had a little bit of chest tightness.  He states that has resolved now.  Patient is currently asymptomatic.  Patient denies any history of blood clots, he has not had any leg swelling, no recent cancer or surgery.  Patient denies any drug use.  Differential includes PE, MI, pneumonia.  Patient is PERC negative.  I think he is at low risk for a PE.  His troponin is negative, and EKG does not show any signs of ischemia.  He has not had any infection symptoms that would indicate pericarditis or myocarditis.  Chest x-ray does not show any pleural effusion, pneumothorax, or consolidation.  I do not see any emergent issues today.  Patient states he is feeling back to baseline.  Patient can follow-up with his primary care provider.  He is given strict return precautions.    Diagnosis:    ICD-10-CM    1. Palpitations  R00.2          Scribe Disclosure:  I, Lacie Ibarra, am serving as a scribe at 2:13 PM on 10/2/2023 to document services personally performed by Shilpa Weinberg DO based on my observations and the provider's statements to me.    10/2/2023   Shilpa Weinberg DO Richardson, Elizabeth, MD  10/02/23 1530

## 2023-10-03 LAB
ATRIAL RATE - MUSE: 52 BPM
DIASTOLIC BLOOD PRESSURE - MUSE: NORMAL MMHG
INTERPRETATION ECG - MUSE: NORMAL
P AXIS - MUSE: 70 DEGREES
PR INTERVAL - MUSE: 116 MS
QRS DURATION - MUSE: 112 MS
QT - MUSE: 422 MS
QTC - MUSE: 392 MS
R AXIS - MUSE: 66 DEGREES
SYSTOLIC BLOOD PRESSURE - MUSE: NORMAL MMHG
T AXIS - MUSE: 71 DEGREES
VENTRICULAR RATE- MUSE: 52 BPM